# Patient Record
Sex: MALE | Race: WHITE | NOT HISPANIC OR LATINO | URBAN - METROPOLITAN AREA
[De-identification: names, ages, dates, MRNs, and addresses within clinical notes are randomized per-mention and may not be internally consistent; named-entity substitution may affect disease eponyms.]

---

## 2019-02-21 ENCOUNTER — IMPORTED ENCOUNTER (OUTPATIENT)
Dept: URBAN - METROPOLITAN AREA CLINIC 31 | Facility: CLINIC | Age: 59
End: 2019-02-21

## 2019-02-21 PROBLEM — H02.834: Noted: 2019-02-21

## 2019-02-21 PROBLEM — H02.831: Noted: 2019-02-21

## 2019-02-21 PROCEDURE — 92004 COMPRE OPH EXAM NEW PT 1/>: CPT

## 2019-02-21 PROCEDURE — 92015 DETERMINE REFRACTIVE STATE: CPT

## 2019-02-21 NOTE — PATIENT DISCUSSION
1.  Refractive error Annual Good ocular health documented. Discussed options of glasses contacts or refractive surgery. Discussed importance of annual eye exams. 2.  Dermatochalasis OU:  Patient currently asymptomatic. Observe.

## 2019-10-31 NOTE — PATIENT DISCUSSION
OS to follow. Eye OS, Iol Type MF/Toric lens, Post Operative Target [place]/-0.50, DR recommendation MF/Toric pkge, Package Toric ROF/MF.

## 2019-10-31 NOTE — PATIENT DISCUSSION
Eye OD, Iol Type MF/Toric lens, Post Operative Target [place]/-0.50, DR recommendation MF/Toric pkge, Package Toric ROF/MF.

## 2019-10-31 NOTE — PATIENT DISCUSSION
The risks, benefits and alternatives of cataract surgery were discussed with the patient. Risks including but not limited to: Infection, retinal detachment, lens dislocation, inflammation, loss of vision, increased pressure and need for further surgery. We discussed all the lens options including monofocal lens, toric lens, multifocal lens, astigmatism correction and other options. The patient understands that they may need glasses for optimal vision with any option. Patient elects the lifestyle lens explained astigmatism can be corrected in this lens. Right eye first to have sx. To continue to use artificial tears 3-4 times a day before surgery.

## 2019-11-12 NOTE — PATIENT DISCUSSION
OS to follow. Eye OS, Iol Type MF/Toric lens, Post Operative Target [place]/-0.50, DR recommendation MF/Toric pkge, Package Toric ROF/MF.

## 2019-11-12 NOTE — PATIENT DISCUSSION
Eye OD, Iol Type MF/Toric lens, Post Operative Target [place]/-0.50, DR recommendation MF/Toric pkge, Package Toric ROF/MF.

## 2019-11-13 NOTE — PATIENT DISCUSSION
OS to follow. Eye OS, Iol Type MF/Toric lens, Post Operative Target [place]/-0.50, DR recommendation MF/Toric pkge, Package Toric ROF/MF.

## 2019-11-18 NOTE — PATIENT DISCUSSION
Continue eye drops per instructions. Pt c/o stinging when instilling Dr. Ambika Sweeney recommended to refrigerate.

## 2019-11-18 NOTE — PATIENT DISCUSSION
OS to follow. Eye OS, Iol Type MF/Toric lens, Post Operative Target [place]/-0.50, DR recommendation MF/Toric pkge, Package Toric ROF/MF.

## 2019-11-20 NOTE — PATIENT DISCUSSION
OS to follow. Eye OS, Iol Type MF/Toric lens, Post Operative Target [place]/-0.50, DR recommendation MF/Toric pkge, Package Toric ROF/MF.

## 2019-11-20 NOTE — PATIENT DISCUSSION
Continue eye drops per instructions. Pt c/o stinging when instilling Dr. Hieu Thomas recommended to refrigerate.

## 2019-11-21 NOTE — PATIENT DISCUSSION
OS to follow. Eye OS, Iol Type MF/Toric lens, Post Operative Target [place]/-0.50, DR recommendation MF/Toric pkge, Package Toric ROF/MF.

## 2019-11-21 NOTE — PATIENT DISCUSSION
Continue eye drops per instructions. Pt c/o stinging when instilling Dr. Daphnie Jauregui recommended to refrigerate.

## 2019-11-26 NOTE — PATIENT DISCUSSION
OS to follow. Eye OS, Iol Type MF/Toric lens, Post Operative Target [place]/-0.50, DR recommendation MF/Toric pkge, Package Toric ROF/MF.

## 2020-01-08 NOTE — PATIENT DISCUSSION
OS to follow. Eye OS, Iol Type MF/Toric lens, Post Operative Target [place]/-0.50, DR recommendation MF/Toric pkge, Package Toric ROF/MF.

## 2020-01-08 NOTE — PATIENT DISCUSSION
Pt very sensitive to low level of astigmatism OS and will try glasses to help with reading and night vision symptoms. If glasses not enough help then consider IOL exchange OS.

## 2020-01-23 NOTE — PATIENT DISCUSSION
IOL exchange recommended due to Pt very sensitive in OD with night vision symptoms. Pt c/o star bursting symptoms on OD even after trying glasses to help with reading and night vision symptoms. Pt states further refinement of correction with glasses is not enough help. Recommend sx for  IOL exchange OD to target strictly distance vision. Pt will be more dependent on glasses for reading. I discussed risks including damage to intraocular structures including cornea and iris and retina. Including retinal detachment and CME. Plan for TORIC single focus IOL with PL to -0.50 target.

## 2020-01-23 NOTE — PATIENT DISCUSSION
OS to follow. Eye OS, Iol Type MF/Toric lens, Post Operative Target [place]/-0.50, DR recommendation MF/Toric pkge, Package Toric ROF/MF.

## 2020-01-23 NOTE — PATIENT DISCUSSION
Patient is c/o having trouble with night driving. Recommended trial RX spec's for driving at night as a first step to  refine vision. If glasses don't help then the possibility for sx to remove the lens.

## 2020-02-18 NOTE — PATIENT DISCUSSION
OS to follow. Eye OS, Iol Type MF/Toric lens, Post Operative Target [place]/-0.50, DR recommendation MF/Toric pkge, Package Toric ROF/MF.

## 2020-02-19 NOTE — PATIENT DISCUSSION
OS to follow. Eye OS, Iol Type MF/Toric lens, Post Operative Target [place]/-0.50, DR recommendation MF/Toric pkge, Package Toric ROF/MF.

## 2020-02-24 NOTE — PATIENT DISCUSSION
Patient states glare and halos at night have much improved. Patient is having trouble with near vision. Advised that OD now has a monofocal lens and will need readers for close work. Discussed that MF lens OS will give some intermediate/near vision.

## 2020-02-24 NOTE — PATIENT DISCUSSION
OS to follow. Eye OS, Iol Type MF/Toric lens, Post Operative Target [place]/-0.50, DR recommendation MF/Toric pkge, Package Toric ROF/MF.

## 2020-05-06 NOTE — PATIENT DISCUSSION
Patient states he is not seeing well at any distance and he is very unhappy about his vision. Patient states his distance vision is not good and neither is his near vision. Patient would like to have a lens exchange OS to make both eyes targeted at the same distance. Patient is leaving for [place] and can possibly schedule in July or October.

## 2020-05-06 NOTE — PATIENT DISCUSSION
Patient had improvement after IOL exchange OS. Patient understands he will need to wear readers for near at all times.

## 2020-05-06 NOTE — PATIENT DISCUSSION
OS to follow. Eye OS, Iol Type MF/Toric lens, Post Operative Target [place]/-0.50, DR recommendation MF/Toric pkge, Package Toric ROF/MF.

## 2020-05-06 NOTE — PATIENT DISCUSSION
The risks, benefits and alternatives of IOL exchange were discussed with the patient. Risks including but not limited to: Infection, retinal detachment, lens dislocation, inflammation, loss of vision, increased pressure and need for further surgery. The patient understands that they may need glasses for optimal vision with any option.

## 2021-01-13 NOTE — PATIENT DISCUSSION
The risks, benefits and alternatives of IOL exchange were discussed with the patient. Risks including but not limited to: lens dislocation, inflammation, loss of vision, increased pressure and need for further surgery. The patient understands that they may need glasses for optimal vision with any option. Patient left during discussion.

## 2021-01-13 NOTE — PATIENT DISCUSSION
OS to follow. Eye OS, Iol Type MF/Toric lens, Post Operative Target [place]/-0.50, DR recommendation MF/Toric pkge, Package Toric ROF/MF.

## 2021-01-13 NOTE — PATIENT DISCUSSION
Patient was re refracted and trial frames were attempted to see if there was any benefit in changing the glasses. With further testing today, the patient did not notice a significant benefit with either eye by changing the prescription. Since the patient has been dissatisfied with his vision, particularly on the right eye, we discussed the option for IOL exchange OS again. Discussed risks and potential damage to support system of lens and possible suturing of the lens if this were to occur. During discussion patient appeared to be more frustrated and upset and decided to leave during discussion. He stated that he wanted to get another opinion.

## 2021-01-13 NOTE — PATIENT DISCUSSION
Patient states his vision is not good at all distances and would like to have IOL OS exchanged to distance only like his OD. Patient would like to have a lens exchange OS to make both eyes targeted at the same distance.

## 2021-01-13 NOTE — PATIENT DISCUSSION
Patient states glare and halos at night have much improved. Patient is having trouble with near vision. Patient understands that OD now has a monofocal lens and will need readers for close work and OS vision.

## 2022-04-01 ASSESSMENT — TONOMETRY
OD_IOP_MMHG: 17
OS_IOP_MMHG: 15

## 2022-04-01 ASSESSMENT — VISUAL ACUITY
OD_CC: 20/20-3
OD_CC: J320''
OS_CC: J320''
OS_CC: 20/25-2

## 2023-10-03 NOTE — PATIENT DISCUSSION
Patient states he had improvement after IOL exchange OD. Patient understands he will need to wear readers for near at all times. Burow's Graft Text: The defect edges were debeveled with a #15 scalpel blade.  Given the location of the defect, shape of the defect, the proximity to free margins and the presence of a standing cone deformity a Burow's skin graft was deemed most appropriate. The standing cone was removed and this tissue was then trimmed to the shape of the primary defect. The adipose tissue was also removed until only dermis and epidermis were left.  The skin margins of the secondary defect were undermined to an appropriate distance in all directions utilizing iris scissors.  The secondary defect was closed with interrupted buried subcutaneous sutures.  The skin edges were then re-apposed with running  sutures.  The skin graft was then placed in the primary defect and oriented appropriately.

## 2024-01-08 NOTE — PATIENT DISCUSSION
Patient is c/o having trouble with night driving. Recommended trial RX spec's for driving at night as a first step to  refine vision. If glasses don't help then the possibility for sx to remove the lens. <-- Click to add NO significant Past Surgical History
